# Patient Record
Sex: FEMALE | Race: BLACK OR AFRICAN AMERICAN | NOT HISPANIC OR LATINO | URBAN - METROPOLITAN AREA
[De-identification: names, ages, dates, MRNs, and addresses within clinical notes are randomized per-mention and may not be internally consistent; named-entity substitution may affect disease eponyms.]

---

## 2019-04-18 ENCOUNTER — HOSPITAL ENCOUNTER (OUTPATIENT)
Dept: TELEMEDICINE | Facility: HOSPITAL | Age: 74
Discharge: HOME | End: 2019-04-18

## 2019-04-18 DIAGNOSIS — G93.41 ACUTE METABOLIC ENCEPHALOPATHY: ICD-10-CM

## 2019-04-18 PROCEDURE — G0427 INPT/ED TELECONSULT70: HCPCS | Mod: GT,,, | Performed by: PSYCHIATRY & NEUROLOGY

## 2019-04-18 PROCEDURE — G0427 PR INPT TELEHEALTH CON 70/>M: ICD-10-PCS | Mod: GT,,, | Performed by: PSYCHIATRY & NEUROLOGY

## 2019-04-18 NOTE — CONSULTS
[facility]  Vascular Neurology  Comprehensive Stroke Center  Tele-Consultation Note      Consults    Consulting Provider: MICHELLE JUNG  Current Providers  No providers found    Patient Location: [facility] ED  Emergency Department  Spoke hospital nurse at bedside with patient assisting consultant.     Patient information was obtained from ED team.         Assessment/Plan:     STROKE DOCUMENTATION     Acute Stroke Times:   Acute Stroke Times   Last Known Normal Date: 04/18/19  Last Known Normal Time: 0247  Symptom Onset Date: 04/18/19  Symptom Onset Time: 0410  Stroke Team Called Date: 04/18/19  Stroke Team Called Time: 0422  Stroke Team Arrival Date: 04/18/19  Stroke Team Arrival Time: 0430    NIH Scale:  Interval: baseline  1a. Level of Consciousness: 0-->Alert, keenly responsive  1b. LOC Questions: 1-->Answers one question correctly  1c. LOC Commands: 0-->Performs both tasks correctly  2. Best Gaze: 0-->Normal  3. Visual: 0-->No visual loss  4. Facial Palsy: 0-->Normal symmetrical movements  5a. Motor Arm, Left: 1-->Drift, limb holds 90 (or 45) degrees, but drifts down before full 10 seconds, does not hit bed or other support  5b. Motor Arm, Right: 1-->Drift, limb holds 90 (or 45) degrees, but drifts down before full 10 secs, does not hit bed or other support  6a. Motor Leg, Left: 3-->No effort against gravity, leg falls to bed immediately  6b. Motor Leg, Right: 4-->No movement  7. Limb Ataxia: 0-->Absent  8. Sensory: 0-->Normal, no sensory loss  9. Best Language: 0-->No aphasia, normal  10. Dysarthria: 1-->Mild-to-moderate dysarthria, patient slurs at least some words and, at worst, can be understood with some difficulty  11. Extinction and Inattention (formerly Neglect): 0-->No abnormality  Total (NIH Stroke Scale): 11     Modified Magna Score: 0  Abilio Coma Scale:    ABCD2 Score:    [id number] Score:   HAS -BLED Score:   ICH Score:    Barndt & Schwartz Classification:       Diagnoses:   Acute metabolic encephalopathy  Hypotension, sepsis.  Defer management to ED team          There were no vitals taken for this visit.  Alteplase Eligible?: No  Alteplase Recommendation: Alteplase not recommended due to Suspected stroke mimic   Possible Interventional Revascularization Candidate? No;  Large core infarct    Disposition Recommendation: admit to inpatient    Subjective:     History of Present Illness:  Presented with Shortness of breath. @410am patient developed sudden altered mental status, bilateral weakness, word finding difficulties. Mental status has improved since onset    ESRD  Afiib  CABG  HTN      Woke up with symptoms?: no  Last known normal:       Recent bleeding noted: no  Does the patient take any Blood Thinners? no  Medications: Unknown      Past Medical History: hypertension and kidney problems/dialysis    Past Surgical History: none    Family History: no relevant history    Social History: no smoking, no drinking, no drugs    Allergies: Allergies have not been reviewed unknown    Review of Systems   Unable to perform ROS: Mental status change     Objective:   Vitals: There were no vitals taken for this visit. BP: 70/60, Respiratory Rate: 14 and Heart Rate: 45    CT READ: Yes  No hemmorhage. No mass effect. No early infarct signs.     Physical Exam   Constitutional: She appears well-developed and well-nourished.   Eyes: Pupils are equal, round, and reactive to light. EOM are normal.   Pulmonary/Chest: She is in respiratory distress.   Neurological:   Drowsy               Recommended the emergency room physician to have a brief discussion with the patient and/or family if available regarding the risks and benefits of treatment, and to briefly document the occurrence of that discussion in his clinical encounter note.     The attending portion of this evaluation, treatment, and documentation was performed per Rob Rachel MD via  audiovisual.    Billing code:  ([facility] INPT CONSULT 35MIN)    In your opinion, this was a: Tier 2 N/A    Consult End Time: 4:49 AM     Rob Rachel MD  Comprehensive Stroke Center  Vascular Neurology   [facility]

## 2019-04-18 NOTE — SUBJECTIVE & OBJECTIVE
Woke up with symptoms?: no  Last known normal:       Recent bleeding noted: no  Does the patient take any Blood Thinners? no  Medications: Unknown      Past Medical History: hypertension and kidney problems/dialysis    Past Surgical History: none    Family History: no relevant history    Social History: no smoking, no drinking, no drugs    Allergies: Allergies have not been reviewed unknown    Review of Systems   Unable to perform ROS: Mental status change     Objective:   Vitals: There were no vitals taken for this visit. BP: 70/60, Respiratory Rate: 14 and Heart Rate: 45    CT READ: Yes  No hemmorhage. No mass effect. No early infarct signs.     Physical Exam   Constitutional: She appears well-developed and well-nourished.   Eyes: Pupils are equal, round, and reactive to light. EOM are normal.   Pulmonary/Chest: She is in respiratory distress.   Neurological:   Drowsy

## 2019-04-18 NOTE — HPI
Presented with Shortness of breath. @410am patient developed sudden altered mental status, bilateral weakness, word finding difficulties. Mental status has improved since onset    ESRD  Afiib  CABG  HTN